# Patient Record
Sex: FEMALE | Race: WHITE | Employment: UNEMPLOYED | URBAN - METROPOLITAN AREA
[De-identification: names, ages, dates, MRNs, and addresses within clinical notes are randomized per-mention and may not be internally consistent; named-entity substitution may affect disease eponyms.]

---

## 2023-08-25 ENCOUNTER — HOSPITAL ENCOUNTER (OUTPATIENT)
Age: 38
Discharge: HOME | End: 2023-08-25
Payer: MEDICAID

## 2023-08-25 VITALS
DIASTOLIC BLOOD PRESSURE: 96 MMHG | TEMPERATURE: 98 F | HEART RATE: 95 BPM | SYSTOLIC BLOOD PRESSURE: 128 MMHG | RESPIRATION RATE: 20 BRPM | OXYGEN SATURATION: 98 %

## 2023-08-25 DIAGNOSIS — J02.9 VIRAL PHARYNGITIS: Primary | ICD-10-CM

## 2023-08-25 DIAGNOSIS — Z20.822 LAB TEST NEGATIVE FOR COVID-19 VIRUS: ICD-10-CM

## 2023-08-25 DIAGNOSIS — R03.0 ELEVATED BLOOD PRESSURE READING: ICD-10-CM

## 2023-08-25 LAB
S PYO AG THROAT QL: NEGATIVE
SARS-COV-2 RNA RESP QL NAA+PROBE: NOT DETECTED

## 2023-08-25 RX ORDER — DEXAMETHASONE SODIUM PHOSPHATE 10 MG/ML
10 INJECTION, SOLUTION INTRAMUSCULAR; INTRAVENOUS ONCE
Status: COMPLETED | OUTPATIENT
Start: 2023-08-25 | End: 2023-08-25

## 2023-08-25 NOTE — DISCHARGE INSTRUCTIONS
Take over-the-counter ibuprofen or Tylenol for pain. Follow-up with primary care provider 2 to 3 days for reevaluation of symptoms and for elevated blood pressure reading. You may try Chloraseptic spray or throat lozenges gargling with salt water and spitting it out. If you develop neck swelling fever headache nausea vomiting inability to swallow your own saliva or 1 tonsil appears to be significantly bigger than the other pushing against the uvula or any new or worsening symptoms go to the nearest emergency department.